# Patient Record
Sex: MALE | Race: WHITE | NOT HISPANIC OR LATINO | Employment: FULL TIME | URBAN - NONMETROPOLITAN AREA
[De-identification: names, ages, dates, MRNs, and addresses within clinical notes are randomized per-mention and may not be internally consistent; named-entity substitution may affect disease eponyms.]

---

## 2023-01-13 ENCOUNTER — OFFICE VISIT (OUTPATIENT)
Dept: UROLOGY | Facility: CLINIC | Age: 43
End: 2023-01-13
Payer: COMMERCIAL

## 2023-01-13 VITALS
OXYGEN SATURATION: 95 % | HEART RATE: 73 BPM | WEIGHT: 243 LBS | BODY MASS INDEX: 29.59 KG/M2 | DIASTOLIC BLOOD PRESSURE: 90 MMHG | TEMPERATURE: 98.2 F | HEIGHT: 76 IN | SYSTOLIC BLOOD PRESSURE: 130 MMHG

## 2023-01-13 DIAGNOSIS — E29.1 HYPOGONADISM MALE: Primary | ICD-10-CM

## 2023-01-13 PROCEDURE — 99204 OFFICE O/P NEW MOD 45 MIN: CPT | Performed by: PHYSICIAN ASSISTANT

## 2023-01-13 RX ORDER — MELATONIN: COMMUNITY

## 2023-01-13 RX ORDER — DEXTROAMPHETAMINE SULFATE, DEXTROAMPHETAMINE SACCHARATE, AMPHETAMINE SULFATE AND AMPHETAMINE ASPARTATE 6.25; 6.25; 6.25; 6.25 MG/1; MG/1; MG/1; MG/1
CAPSULE, EXTENDED RELEASE ORAL
COMMUNITY
Start: 2022-12-15

## 2023-01-13 RX ORDER — IBUPROFEN 800 MG/1
TABLET ORAL
COMMUNITY
Start: 2023-01-08

## 2023-01-13 RX ORDER — ESOMEPRAZOLE MAGNESIUM 40 MG/1
CAPSULE, DELAYED RELEASE ORAL
COMMUNITY
Start: 2022-12-28

## 2023-01-13 RX ORDER — LAMOTRIGINE 100 MG/1
TABLET ORAL
COMMUNITY
Start: 2022-11-21

## 2023-01-13 NOTE — PROGRESS NOTES
"Chief Complaint  Low testosterone    Referring Provider  Amalia Praekh APRN    HPI  Mr. Rosales is a 42 y.o. male with history of low T who presents with low testosterone.  The serum test was collected due to the following complaints: fatigue, low libido.    Previously saw Dr. Martinez, 2 years ago, who treated with weekly TC and arimidex. Developed kidney disease, medicines stopped, and kidney function returned to normal. Last HRT was about 2 years ago.     Notes he sleeps about 5 hours per day, chronically     Low libido   yes  Low energy   yes  Poor sleep   yes  Mental clarity issues  yes    History of depression? no  Erectile dysfunction?  no    Any potential interest in conception?  no    Past Medical History  Past Medical History:   Diagnosis Date   • ADHD    • Mood change        Past Surgical History  Past Surgical History:   Procedure Laterality Date   • KNEE SURGERY  2004       Medications    Current Outpatient Medications:   •  Adderall XR 25 MG 24 hr capsule, , Disp: , Rfl:   •  cholecalciferol (VITAMIN D3) 25 MCG (1000 UT) tablet, , Disp: , Rfl:   •  esomeprazole (nexIUM) 40 MG capsule, , Disp: , Rfl:   •  ibuprofen (ADVIL,MOTRIN) 800 MG tablet, TAKE ONE TABLET BY MOUTH WITH FOOD OR MILK THREE TIMES DAILY AS NEEDED FOR 10 DAYS, Disp: , Rfl:   •  lamoTRIgine (LaMICtal) 100 MG tablet, , Disp: , Rfl:   •  Vortioxetine HBr (TRINTELLIX) 10 MG tablet tablet, , Disp: , Rfl:     Allergies  Allergies   Allergen Reactions   • Cephalosporins Anaphylaxis   • Doxycycline Anaphylaxis   • Reglan [Metoclopramide] Irritability   • Mobic [Meloxicam] Rash   • Sulfa Antibiotics Rash       Social History  Social History     Socioeconomic History   • Marital status:        Family History  Paternal grandfather had prostate cancer      Physical Exam  Visit Vitals  /90   Pulse 73   Temp 98.2 °F (36.8 °C)   Ht 193 cm (76\")   Wt 110 kg (243 lb)   SpO2 95%   BMI 29.58 kg/m²       Assessment  Mr. Rosales is a 42 y.o. male " with low testosterone.  Today we discussed the role testosterone plays for a male patient. I have indicated that low testosterone can be associated with metabolic syndrome, erectile dysfunction, coronary artery disease, diabetes, depression, osteoporosis, fatigue, low sex drive, poor sleep, high cholesterol, increased abdominal fat, muscle loss, irritability, hot flashes, and inability to concentrate.     Treatment options were discussed including SERMs, aromatase inhibitors, topical gel or patch, oral troches, nasal spray, testosterone injections every 2-3 weeks, long-acting injections every 10 weeks, and testosterone pellets placed in clinic every 4-6 months.    I explained the risks, benefits, and alternatives to testosterone therapies. I informed him of the potential SEs of chest and leg swelling, increased acne, change in mood, polycythemia, and worsening of undiagnosed prostate cancer.       Plan  1.  He wishes to proceed with a trial of testosterone cypionate (will start once 2nd set of labs obtained)  2. Obtain testosterone panel, H&H, BMP, and PSA   3. Will obtain records from PCP        Jing Jones PA-C

## 2023-01-18 DIAGNOSIS — E29.1 HYPOGONADISM MALE: Primary | ICD-10-CM

## 2023-01-18 RX ORDER — BLOOD PRESSURE TEST KIT
KIT MISCELLANEOUS
Qty: 100 EACH | Refills: 11 | Status: SHIPPED | OUTPATIENT
Start: 2023-01-18

## 2023-01-18 RX ORDER — TESTOSTERONE CYPIONATE 200 MG/ML
100 INJECTION, SOLUTION INTRAMUSCULAR WEEKLY
Qty: 2 ML | Refills: 0 | Status: SHIPPED | OUTPATIENT
Start: 2023-01-18 | End: 2023-02-17

## 2023-02-17 DIAGNOSIS — E29.1 HYPOGONADISM MALE: ICD-10-CM

## 2023-02-17 RX ORDER — TESTOSTERONE CYPIONATE 200 MG/ML
INJECTION, SOLUTION INTRAMUSCULAR
Qty: 2 ML | Refills: 0 | Status: SHIPPED | OUTPATIENT
Start: 2023-02-17 | End: 2023-03-13

## 2023-03-13 DIAGNOSIS — E29.1 HYPOGONADISM MALE: ICD-10-CM

## 2023-03-13 RX ORDER — TESTOSTERONE CYPIONATE 200 MG/ML
INJECTION, SOLUTION INTRAMUSCULAR
Qty: 2 ML | Refills: 0 | Status: SHIPPED | OUTPATIENT
Start: 2023-03-13

## 2023-04-14 DIAGNOSIS — E29.1 HYPOGONADISM MALE: ICD-10-CM

## 2023-04-14 RX ORDER — TESTOSTERONE CYPIONATE 200 MG/ML
INJECTION, SOLUTION INTRAMUSCULAR
Qty: 2 ML | Refills: 0 | OUTPATIENT
Start: 2023-04-14

## 2023-04-25 DIAGNOSIS — E29.1 HYPOGONADISM MALE: ICD-10-CM

## 2023-04-25 RX ORDER — TESTOSTERONE CYPIONATE 200 MG/ML
INJECTION, SOLUTION INTRAMUSCULAR
Qty: 2 ML | Refills: 0 | Status: SHIPPED | OUTPATIENT
Start: 2023-04-25

## 2023-05-12 ENCOUNTER — TELEPHONE (OUTPATIENT)
Dept: UROLOGY | Facility: CLINIC | Age: 43
End: 2023-05-12
Payer: COMMERCIAL

## 2023-05-12 NOTE — TELEPHONE ENCOUNTER
Called and spoke with patient giving him a reminder to get his labs completed before his appt on 5/19.    Reg, CMA

## 2023-05-19 ENCOUNTER — OFFICE VISIT (OUTPATIENT)
Dept: UROLOGY | Facility: CLINIC | Age: 43
End: 2023-05-19
Payer: COMMERCIAL

## 2023-05-19 VITALS
HEIGHT: 76 IN | TEMPERATURE: 97.1 F | SYSTOLIC BLOOD PRESSURE: 118 MMHG | DIASTOLIC BLOOD PRESSURE: 84 MMHG | WEIGHT: 243 LBS | BODY MASS INDEX: 29.59 KG/M2 | HEART RATE: 73 BPM | OXYGEN SATURATION: 96 %

## 2023-05-19 DIAGNOSIS — E29.1 HYPOGONADISM MALE: Primary | ICD-10-CM

## 2023-05-19 RX ORDER — TESTOSTERONE CYPIONATE 200 MG/ML
200 INJECTION, SOLUTION INTRAMUSCULAR WEEKLY
Qty: 4 ML | Refills: 0 | Status: CANCELLED | OUTPATIENT
Start: 2023-05-19

## 2023-05-19 RX ORDER — NEEDLES, DISPOSABLE 25GX5/8"
1 NEEDLE, DISPOSABLE MISCELLANEOUS WEEKLY
Qty: 50 EACH | Refills: 11 | Status: SHIPPED | OUTPATIENT
Start: 2023-05-19

## 2023-05-19 RX ORDER — TESTOSTERONE CYPIONATE 200 MG/ML
200 INJECTION, SOLUTION INTRAMUSCULAR WEEKLY
Qty: 4 ML | Refills: 0 | Status: SHIPPED | OUTPATIENT
Start: 2023-05-19

## 2023-05-19 RX ORDER — SODIUM CHLORIDE 9 MG/ML
250 INJECTION, SOLUTION INTRAVENOUS ONCE
OUTPATIENT
Start: 2023-05-19

## 2023-05-19 RX ORDER — LISDEXAMFETAMINE DIMESYLATE 50 MG
CAPSULE ORAL
COMMUNITY
Start: 2023-05-16

## 2023-05-19 NOTE — PROGRESS NOTES
"Chief Complaint   Patient presents with   • Hypogonadism     3 month fu w/ lab results        HPI  Mr. Rosales is a 42 y.o. male with history of hypgonadism on HRT who presents for follow up.     At this visit, has been injecting weekly as prescribed.  He continues to feel very fatigued with very little drive.  He actually has not exercised in several weeks.  He does admit to very poor sleep quantity getting maybe 4 to 6 hours of sleep per night.    Past Medical History:   Diagnosis Date   • ADHD    • Mood change        Past Surgical History:   Procedure Laterality Date   • KNEE SURGERY  2004         Current Outpatient Medications:   •  Alcohol Swabs pads, Clean area prior to injection, Disp: 100 each, Rfl: 11  •  cholecalciferol (VITAMIN D3) 25 MCG (1000 UT) tablet, , Disp: , Rfl:   •  esomeprazole (nexIUM) 40 MG capsule, , Disp: , Rfl:   •  ibuprofen (ADVIL,MOTRIN) 800 MG tablet, TAKE ONE TABLET BY MOUTH WITH FOOD OR MILK THREE TIMES DAILY AS NEEDED FOR 10 DAYS, Disp: , Rfl:   •  lamoTRIgine (LaMICtal) 100 MG tablet, , Disp: , Rfl:   •  Needle, Disp, 18G X 1-1/2\" misc, Use for drawing up the medication, Disp: 50 each, Rfl: 3  •  Needle, Disp, 23G X 1-1/2\" misc, 1 Device 1 (One) Time Per Week., Disp: 30 each, Rfl: 11  •  Syringe, Disposable, 3 ML misc, 1 syringe 1 (One) Time Per Week., Disp: 100 each, Rfl: 11  •  Testosterone Cypionate (DEPOTESTOTERONE CYPIONATE) 200 MG/ML injection, INJECT 0.5ML INTO THE APPROPRIATE MUSCLE AS DIRECTED ONCE A WEEK..REQUEST REFILL OF SCRIPT IMMEDIATELY UPON PICKUP THRU PHARMACY OR MYCHART, Disp: 2 mL, Rfl: 0  •  Vortioxetine HBr (TRINTELLIX) 10 MG tablet tablet, , Disp: , Rfl:   •  Adderall XR 25 MG 24 hr capsule, , Disp: , Rfl:   •  Vyvanse 50 MG capsule, , Disp: , Rfl:      Physical Exam  Visit Vitals  /84 (BP Location: Left arm, Patient Position: Sitting, Cuff Size: Adult)   Pulse 73   Temp 97.1 °F (36.2 °C) (Temporal)   Ht 193 cm (76\")   Wt 110 kg (243 lb)   SpO2 96%   BMI " 29.58 kg/m²       Labs  Total testosterone 185  Hematocrit 52      Assessment  42 y.o. male with history of hypogonadism on HRT.    He tells me that he was 1 day overdue for his injection when he had his labs obtained.  That said his total testosterone is quite subtherapeutic I suspect he is not fully treated with 100 mg weekly.  I recommend we increase his dose to 200 mg weekly.  We will need to manage his polycythemia today and watch closely that this does not increase with dose increase.    Plan  1.  Therapeutic phlebotomy versus KBC donation, per patient preference (both are prescribed)  2.  Start testosterone cypionate 200 mg weekly  3.  Follow-up in 3 months by phone with testosterone, estradiol, and H&H prior

## 2023-07-28 DIAGNOSIS — E29.1 HYPOGONADISM MALE: ICD-10-CM

## 2023-07-28 RX ORDER — TESTOSTERONE CYPIONATE 200 MG/ML
200 INJECTION, SOLUTION INTRAMUSCULAR WEEKLY
Qty: 4 ML | Refills: 0 | Status: SHIPPED | OUTPATIENT
Start: 2023-07-28

## 2023-08-18 ENCOUNTER — TELEMEDICINE (OUTPATIENT)
Dept: UROLOGY | Facility: CLINIC | Age: 43
End: 2023-08-18
Payer: COMMERCIAL

## 2023-08-18 DIAGNOSIS — E29.1 HYPOGONADISM MALE: ICD-10-CM

## 2023-08-18 RX ORDER — TESTOSTERONE CYPIONATE 200 MG/ML
200 INJECTION, SOLUTION INTRAMUSCULAR WEEKLY
Qty: 4 ML | Refills: 0 | Status: SHIPPED | OUTPATIENT
Start: 2023-08-18

## 2023-08-18 NOTE — PROGRESS NOTES
"Chief Complaint   Patient presents with    Hypogonadism        HPI  Mr. Rosales is a 43 y.o. male with history of hypogonadism who consented to receive follow-up care by A/V visit.    At this visit, overall is feeling well on HRT.  He does note he continues to have some fatigue.  He is not sure if this is related to testosterone or his sleep schedule with work.    Past Medical History:   Diagnosis Date    ADHD     Mood change        Past Surgical History:   Procedure Laterality Date    KNEE SURGERY  2004         Current Outpatient Medications:     Testosterone Cypionate (DEPOTESTOTERONE CYPIONATE) 200 MG/ML injection, Inject 1 mL into the appropriate muscle as directed by prescriber 1 (One) Time Per Week., Disp: 4 mL, Rfl: 0    Alcohol Swabs pads, Clean area prior to injection, Disp: 100 each, Rfl: 11    cholecalciferol (VITAMIN D3) 25 MCG (1000 UT) tablet, , Disp: , Rfl:     esomeprazole (nexIUM) 40 MG capsule, , Disp: , Rfl:     ibuprofen (ADVIL,MOTRIN) 800 MG tablet, TAKE ONE TABLET BY MOUTH WITH FOOD OR MILK THREE TIMES DAILY AS NEEDED FOR 10 DAYS, Disp: , Rfl:     lamoTRIgine (LaMICtal) 100 MG tablet, , Disp: , Rfl:     Needle, Disp, (BD Disp Needle) 23G X 1\" misc, 1 Device 1 (One) Time Per Week., Disp: 50 each, Rfl: 11    Needle, Disp, 18G X 1-1/2\" misc, Use for drawing up the medication, Disp: 50 each, Rfl: 3    Syringe, Disposable, 3 ML misc, 1 syringe 1 (One) Time Per Week., Disp: 100 each, Rfl: 11    Vortioxetine HBr (TRINTELLIX) 10 MG tablet tablet, , Disp: , Rfl:     Vyvanse 50 MG capsule, , Disp: , Rfl:      Physical Exam  There were no vitals taken for this visit.    Labs  Brief Urine Lab Results       None              Assessment  43 y.o. male with history of hypogonadism.    Preliminary labs are obtained from [facility].  The patient CBC has not yet returned.  He assures me that he donated blood after our last visit and his polycythemia should be resolved.  His testosterone was obtained within 24 hours of " injection and reflects a peak value at 1207.  His estradiol is at the higher limits at 70.8.  I would expect this value to normalize as he continues HRT.  We will discuss any symptoms of estrogen excess at follow-up and recheck this lab.    Plan  1.  Continue weekly testosterone cypionate 200 mg  2.  Follow-up in 6 months with H&H total T, estradiol    This visit was ATTEMPTED to be conducted via secure, live, face-to-face video conferencing through Epic. Telephone assistance had to be used to complete the visit.

## 2023-09-27 DIAGNOSIS — E29.1 HYPOGONADISM MALE: ICD-10-CM

## 2023-09-28 RX ORDER — TESTOSTERONE CYPIONATE 200 MG/ML
200 INJECTION, SOLUTION INTRAMUSCULAR WEEKLY
Qty: 4 ML | Refills: 0 | Status: SHIPPED | OUTPATIENT
Start: 2023-09-28

## 2023-10-28 DIAGNOSIS — E29.1 HYPOGONADISM MALE: ICD-10-CM

## 2023-10-30 RX ORDER — TESTOSTERONE CYPIONATE 200 MG/ML
200 INJECTION, SOLUTION INTRAMUSCULAR WEEKLY
Qty: 4 ML | Refills: 0 | Status: SHIPPED | OUTPATIENT
Start: 2023-10-30

## 2023-11-27 DIAGNOSIS — E29.1 HYPOGONADISM MALE: ICD-10-CM

## 2023-11-27 RX ORDER — TESTOSTERONE CYPIONATE 200 MG/ML
200 INJECTION, SOLUTION INTRAMUSCULAR WEEKLY
Qty: 4 ML | Refills: 0 | Status: SHIPPED | OUTPATIENT
Start: 2023-11-27

## 2023-12-20 DIAGNOSIS — E29.1 HYPOGONADISM MALE: ICD-10-CM

## 2023-12-20 RX ORDER — TESTOSTERONE CYPIONATE 200 MG/ML
200 INJECTION, SOLUTION INTRAMUSCULAR WEEKLY
Qty: 4 ML | Refills: 0 | Status: SHIPPED | OUTPATIENT
Start: 2023-12-20

## 2023-12-26 ENCOUNTER — TELEPHONE (OUTPATIENT)
Dept: UROLOGY | Facility: CLINIC | Age: 43
End: 2023-12-26

## 2023-12-26 NOTE — TELEPHONE ENCOUNTER
Caller: JUNIOR CERVANTES    Relationship: Emergency Contact    Best call back number: [phone]    What orders are you requesting (i.e. lab or imaging): HEMOGLOBIN BLOOD COUNT WAS TOO HIGH. NEEDS AN ORDER TO REMOVE THE BLOOD VIA FAXED TO PCP AND THEIR FAX NUMBER [phone]    In what timeframe would the patient need to come in: AS SOON AS POSSIBLE    Where will you receive your lab/imaging services: [facility] [place].    Additional notes: RECEIVED A CALL FROM SPOUSE CALLING IN REGARDS TO AN ORDER TO VIA FAXED TO [facility] FOR HIS BLOOD TO BE REMOVED BECAUSE HIS HEMOGLOBIN WAS TOO HIGH. SHE STATED OUR OFFICE IS FAMILIAR WITH THIS PROCESS AND IN THE PAST JEFERSON KUMAR HAS SENT THESE ORDERS

## 2023-12-26 NOTE — TELEPHONE ENCOUNTER
Caller: JUNIOR    Relationship: WIFE    Best call back number: [phone]     What orders are you requesting (i.e. lab or imaging): LAB    In what timeframe would the patient need to come in: ASAP    Where will you receive your lab/imaging services:     Additional notes: PT HAS TOO MUCH BLOOD. NEED ORDER FOR REMOVAL

## 2024-01-02 DIAGNOSIS — E29.1 HYPOGONADISM MALE: Primary | ICD-10-CM

## 2024-01-02 RX ORDER — SODIUM CHLORIDE 9 MG/ML
250 INJECTION, SOLUTION INTRAVENOUS ONCE
OUTPATIENT
Start: 2024-01-12

## 2024-01-03 ENCOUNTER — TELEPHONE (OUTPATIENT)
Dept: UROLOGY | Facility: CLINIC | Age: 44
End: 2024-01-03
Payer: COMMERCIAL

## 2024-01-19 DIAGNOSIS — E29.1 HYPOGONADISM MALE: ICD-10-CM

## 2024-01-19 RX ORDER — TESTOSTERONE CYPIONATE 200 MG/ML
200 INJECTION, SOLUTION INTRAMUSCULAR WEEKLY
Qty: 4 ML | Refills: 0 | Status: SHIPPED | OUTPATIENT
Start: 2024-01-19

## 2024-01-22 RX ORDER — SYRINGE, DISPOSABLE, 1 ML
SYRINGE, EMPTY DISPOSABLE MISCELLANEOUS
Qty: 4 EACH | Refills: 3 | Status: SHIPPED | OUTPATIENT
Start: 2024-01-22

## 2024-01-22 RX ORDER — NEEDLES, DISPOSABLE 25GX5/8"
NEEDLE, DISPOSABLE MISCELLANEOUS
Qty: 4 EACH | Refills: 3 | Status: SHIPPED | OUTPATIENT
Start: 2024-01-22

## 2024-01-23 ENCOUNTER — TELEPHONE (OUTPATIENT)
Dept: UROLOGY | Facility: CLINIC | Age: 44
End: 2024-01-23

## 2024-02-23 ENCOUNTER — OFFICE VISIT (OUTPATIENT)
Dept: UROLOGY | Facility: CLINIC | Age: 44
End: 2024-02-23
Payer: COMMERCIAL

## 2024-02-23 DIAGNOSIS — E29.1 HYPOGONADISM MALE: Primary | ICD-10-CM

## 2024-02-23 RX ORDER — TESTOSTERONE CYPIONATE 200 MG/ML
200 INJECTION, SOLUTION INTRAMUSCULAR WEEKLY
Qty: 4 ML | Refills: 0 | Status: SHIPPED | OUTPATIENT
Start: 2024-02-23 | End: 2024-03-24

## 2024-02-23 RX ORDER — DEXTROAMPHETAMINE SACCHARATE, AMPHETAMINE ASPARTATE, DEXTROAMPHETAMINE SULFATE AND AMPHETAMINE SULFATE 2.5; 2.5; 2.5; 2.5 MG/1; MG/1; MG/1; MG/1
15 TABLET ORAL DAILY
COMMUNITY

## 2024-02-23 RX ORDER — NEEDLES, DISPOSABLE 25GX5/8"
NEEDLE, DISPOSABLE MISCELLANEOUS
Qty: 4 EACH | Refills: 3 | Status: SHIPPED | OUTPATIENT
Start: 2024-02-23

## 2024-02-23 NOTE — PROGRESS NOTES
Office Visit Low Testosterone      Patient Name: Иван Rosales  : 1980   MRN: [id number]     Chief Complaint:   Chief Complaint   Patient presents with    Hypogonadism in male     8 month fu w/ lab results       Referring Provider: No ref. provider found    History of Present Illness: Иван Rosales is a 43 y.o. male with a history of hypogonadism on HRT who presents for follow up.      He is injecting weekly. Total testosterone in December was 1115; free testosterone 24.  He states that he had his labs drawn within 24 hours of injection.  He continues to feel fatigued and admits to poor sleep.  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  He also reports that Vyvanse is not doing a good job  controlling his ADD symptoms which may contribute to symptoms.  He has had therapeutic phlebotomy done twice in the past month.  Most recent HCT was 48.8 on 2024.     Objective     Past Medical History:   Past Medical History:   Diagnosis Date    ADHD     Mood change      Past Surgical History:   Past Surgical History:   Procedure Laterality Date    KNEE SURGERY       Family History:   Family History   Problem Relation Age of Onset    Cancer Mother         Throat cancer    Hypertension Mother     Cancer Father         Throat cancer     Social History:   Social History     Socioeconomic History    Marital status:    Tobacco Use    Smoking status: Never    Smokeless tobacco: Never   Vaping Use    Vaping Use: Never used   Substance and Sexual Activity    Alcohol use: Never    Drug use: Never    Sexual activity: Yes     Partners: Female     Birth control/protection: None     Medications:     Current Outpatient Medications:     Alcohol Swabs pads, Clean area prior to injection, Disp: 100 each, Rfl: 11    amphetamine-dextroamphetamine (ADDERALL) 10 MG tablet, Take 1.5 tablets by mouth Daily., Disp: , Rfl:     B-D SYRINGE LUER-AGAPITO 1CC 1 ML misc, USE WITH  "TESTOSTERONE ONCE A WEEK AS DIRECTED, Disp: 4 each, Rfl: 3    cholecalciferol (VITAMIN D3) 25 MCG (1000 UT) tablet, Take 2 tablets by mouth Daily., Disp: , Rfl:     Needle, Disp, (BD Disp Needle) 23G X 1\" misc, Use as directed to inject testosterone once a week, Disp: 4 each, Rfl: 3    Needle, Disp, (BD Disp Needles) 20G X 1-1/2\" misc, Use as directed to draw up testosterone once a week, Disp: 4 each, Rfl: 3    Needle, Disp, 18G X 1-1/2\" misc, Use for drawing up the medication, Disp: 50 each, Rfl: 3    Syringe, Disposable, 3 ML misc, 1 syringe 1 (One) Time Per Week., Disp: 100 each, Rfl: 11    Testosterone Cypionate (DEPOTESTOTERONE CYPIONATE) 200 MG/ML injection, Inject 1 mL into the appropriate muscle as directed by prescriber 1 (One) Time Per Week for 30 days., Disp: 4 mL, Rfl: 0    Vyvanse 50 MG capsule, , Disp: , Rfl:     Allergies:   Allergies   Allergen Reactions    Cephalosporins Anaphylaxis    Doxycycline Anaphylaxis    Reglan [Metoclopramide] Irritability    Mobic [Meloxicam] Rash    Sulfa Antibiotics Rash       Objective     Physical Exam:   Vital Signs: There were no vitals filed for this visit.  There is no height or weight on file to calculate BMI.   Physical Exam  Vitals and nursing note reviewed.   Constitutional:       Appearance: Normal appearance. He is normal weight. He is not ill-appearing.   HENT:      Head: Normocephalic.   Pulmonary:      Effort: Pulmonary effort is normal.   Psychiatric:         Mood and Affect: Mood normal.         Behavior: Behavior normal.      Labs  12/26/2023:    Testosterone: 1115   Free Testosterone:  24.0  2/16/2024   Hemoglobin: 17.9   Hematocrit 48.8     Assessment / Plan    Assessment  Mr. Rosales is a 43 y.o. male history of hypogonadism on HRT.  Total testosterone 1115; free testosterone 24.0 in December.  Discussed proper timing of testosterone labs and have asked that Mr. Rosales get his labs drawn in the morning midweek after he has taken his injection.  H/H are WNL. "  Has active orders for therapeutic phlebotomy and I will fill out a form for him to be able to donate blood with [facility].  Will recheck his H/H, free and total testosterone and estradiol level in June.      Plan  1.  Weekly testosterone cypionate 200 mg continued and refilled.  Patient aware to contact office for refills monthly.    2.  Repeat total and free testosterone, estradiol, and H/H level in June.  Will follow up with Mr. Rosales in August for his 6 month follow up.   3. Discussed that his fatigue is likely due to a shift work sleep disorder.  Encouraged him to follow up with his PCP to identify other possible causes of fatigue and poor sleep.      Follow Up:   Return in about 6 months (around 8/23/2024) for with labs done in June. .      JEFERSON Ortiz, MSN, FNP-C  [facility]

## 2024-04-04 ENCOUNTER — TELEPHONE (OUTPATIENT)
Dept: UROLOGY | Facility: CLINIC | Age: 44
End: 2024-04-04

## 2024-04-04 DIAGNOSIS — E29.1 HYPOGONADISM MALE: Primary | ICD-10-CM

## 2024-04-04 NOTE — TELEPHONE ENCOUNTER
Provider: FREDERIC LAND    Caller: JUNIOR CERVANTES    Relationship to Patient: SPOUSE    Pharmacy: [facility]- [address]    Phone Number: [phone]    Reason for Call: PT IS OUT OF HIS TESTOSTERONE AND NEEDS REFILL.    PLEASE SEND TO [facility], AND CALL PT WIFE BACK TO CONFIRM SENT    PT IS OUT OF MEDICATION

## 2024-04-05 RX ORDER — TESTOSTERONE CYPIONATE 200 MG/ML
INJECTION, SOLUTION INTRAMUSCULAR
Qty: 4 ML | Refills: 0 | Status: SHIPPED | OUTPATIENT
Start: 2024-04-05

## 2024-05-06 DIAGNOSIS — E29.1 HYPOGONADISM MALE: ICD-10-CM

## 2024-05-06 RX ORDER — TESTOSTERONE CYPIONATE 200 MG/ML
INJECTION, SOLUTION INTRAMUSCULAR
Qty: 4 ML | Refills: 0 | OUTPATIENT
Start: 2024-05-06

## 2024-05-07 DIAGNOSIS — E29.1 HYPOGONADISM MALE: ICD-10-CM

## 2024-05-07 RX ORDER — TESTOSTERONE CYPIONATE 200 MG/ML
200 INJECTION, SOLUTION INTRAMUSCULAR
Qty: 4 ML | Refills: 0 | Status: SHIPPED | OUTPATIENT
Start: 2024-05-07

## 2024-06-03 DIAGNOSIS — E29.1 HYPOGONADISM MALE: ICD-10-CM

## 2024-06-03 RX ORDER — SYRINGE, DISPOSABLE, 1 ML
SYRINGE, EMPTY DISPOSABLE MISCELLANEOUS
Qty: 4 EACH | Refills: 3 | Status: SHIPPED | OUTPATIENT
Start: 2024-06-03

## 2024-06-03 RX ORDER — NEEDLES, DISPOSABLE 25GX5/8"
NEEDLE, DISPOSABLE MISCELLANEOUS
Qty: 4 EACH | Refills: 3 | Status: SHIPPED | OUTPATIENT
Start: 2024-06-03

## 2024-06-04 ENCOUNTER — TELEPHONE (OUTPATIENT)
Dept: UROLOGY | Facility: CLINIC | Age: 44
End: 2024-06-04
Payer: COMMERCIAL

## 2024-06-04 NOTE — TELEPHONE ENCOUNTER
Caller: JUNIOR CERVANTES     Relationship: SPOUSE    Best call back number: [phone]     Requested Prescriptions: Testosterone Cypionate (DEPOTESTOTERONE CYPIONATE) 200 MG/ML injection   Requested Prescriptions      No prescriptions requested or ordered in this encounter        Pharmacy where request should be sent:    [facility] - [phone]  - [phone] FX       Last office visit with prescribing clinician: Visit date not found   Last telemedicine visit with prescribing clinician: Visit date not found   Next office visit with prescribing clinician: Visit date not found     Additional details provided by patient: THERE WAS A REFILL SENT IN FOR THE SYRINGES YESTERDAY BUT NOT FOR THE TESTOSTERONE. THE PT HAS BEEN OUT FOR A FEW DAYS AND IS OVERDUE FOR THE INJECTION.    ANY QUESTIONS, PLEASE GIVE PT'S WIFE A CALL BACK.    Does the patient have less than a 3 day supply:  [x] Yes  [] No    Would you like a call back once the refill request has been completed: [] Yes [x] No    If the office needs to give you a call back, can they leave a voicemail: [] Yes [x] No    Mark English Rep   06/04/24 14:29 EDT

## 2024-06-04 NOTE — TELEPHONE ENCOUNTER
Hub staff attempted to follow warm transfer process and was unsuccessful     Caller: JUNIOR CERVANTES     Relationship to patient: WIFE    Best call back number: [phone]    Patient is needing: PT WIFE CALLING TO STATE PT IS ALSO IN NEED OF A REFILL OF HIS TESTOSTERONE AS WELL PLEASE CALL WIFE BACK TO DISCUSS. THANK YOU        PLEASE SENT REFILL TO     [facility] - [place], [place] - [address] [facility] - [phone]  - [phone] FX

## 2024-06-04 NOTE — TELEPHONE ENCOUNTER
----- Message from Amelia Diaz sent at 6/4/2024  9:21 AM EDT -----  Antonietta,   His HCT is elevated at 51.5 will you call him and make sure he is going to get therapeutic phlebotomy done.  He has an active therapy plan.  If he's already had it done recently will you ask him when. These labs were drawn on 5/27th.  Thanks!   Amelia  ----- Message -----  From: Elisabet Sinclair Incoming  Sent: 6/3/2024   2:33 PM EDT  To: JEFERSON Vale

## 2024-06-05 NOTE — TELEPHONE ENCOUNTER
I called and left  for patient to check his status of therapeutic phlebotomy therapy plan based on latest lab results.  Please transfer to clinical staff, if patient calls back.

## 2024-06-06 ENCOUNTER — TELEPHONE (OUTPATIENT)
Dept: UROLOGY | Facility: CLINIC | Age: 44
End: 2024-06-06
Payer: COMMERCIAL

## 2024-06-06 NOTE — TELEPHONE ENCOUNTER
Hub staff attempted to follow warm transfer process and was unsuccessful     Caller: TERESA    Relationship to patient: PCP    Best call back number: [phone]    Patient is needing: RECEIVED A CALL FROM PCP. SPOKE WITH TERESA AT PT'S PCP. CALLING TO CONFIRM CORRECT FAX NUMBER [phone]. THIS IS THE DIRECT FAX NUMBER TO TERESA PRACTICE MANAGER AT PCP. PT IS CURRENTLY AT THE OFFICE WAITING FOR LABS TO BE FAXED. INFORMED TERESA TO ALLOW 48 HOURS FOR THIS REQUEST TO PROCESS. OFFICE LAB ORDERS FOR ESTRADIOL, HEMOGLOBIN AND HEMATOCRIT TESTOSTERONE, FREE, TOTAL TO BE FAXED TO THE FAX NUMBER PROVIDED PREVIOUS COMMUNICATIONS. OFFICE ONCE LAB ORDERS HAVE BEEN FAXED AND COMPLETED PLEASE CALL PT BACK WITH THESE REGARDS. THANK YOU.

## 2024-06-06 NOTE — TELEPHONE ENCOUNTER
Hub staff attempted to follow warm transfer process and was unsuccessful     Caller: Иван Rosales    Relationship to patient: Self    Best call back number: [phone]    Patient is needing: PATIENT CALLED BACK AND PROVIDED FAX # [phone] FOR HIS LAB ORDER TO BE FAXED TO ASAP. HE IS CURRENTLY AT THE LAB WAITING.

## 2024-06-06 NOTE — TELEPHONE ENCOUNTER
I spoke to patient and let him know the therapeutic phlebotomy order has been faxed to pcp office and refills will not be prescribed until HCT has been lowered. Patient understood.

## 2024-06-06 NOTE — TELEPHONE ENCOUNTER
Hub staff attempted to follow warm transfer process and was unsuccessful     Caller: Иван Rosales    Relationship to patient: Self    Best call back number: [phone]    Patient is needing: PT RETURNED CALL. PLEASE CALL HIM AT YOUR EARLIEST CONVENIENCE. HE IS OUT OF HIS MEDICATION. HE HAS BEEN TRYING TO GET A REFILL ALL WEEK.THANK YOU

## 2024-06-06 NOTE — TELEPHONE ENCOUNTER
Caller: RICK CERVANTES    Relationship: SELF    Best call back number: [phone]    What orders are you requesting (i.e. lab or imaging): therapeutic phlebotomy therapy     In what timeframe would the patient need to come in: PT IS AT THE LAB NOW     Where will you receive your lab/imaging services:     Additional notes: PT CALLED STATING HE IS AT THE LAB AND THEY DO NOT HAVE AN ORDER FOR HIS therapeutic phlebotomy therapy. PLEASE CALL PT ASAP ONCE THE ORDER HAS BEEN FAXED

## 2024-06-07 DIAGNOSIS — E29.1 HYPOGONADISM MALE: ICD-10-CM

## 2024-06-07 NOTE — TELEPHONE ENCOUNTER
Caller: Иван Rosales SATISH    Relationship: Self    Best call back number: [phone]    Requested Prescriptions:   Requested Prescriptions     Pending Prescriptions Disp Refills    Syringe, Disposable, 3 ML misc 100 each 11     Sig: Use 1 syringe 1 (One) Time Per Week.    Testosterone Cypionate (DEPOTESTOTERONE CYPIONATE) 200 MG/ML injection 4 mL 0     Sig: Inject 1 mL into the appropriate muscle as directed by prescriber Every 7 (Seven) Days.        Pharmacy where request should be sent:    [facility] IS THE CORRECT PHARMACY IN CHART.  Last office visit with prescribing clinician: Visit date not found   Last telemedicine visit with prescribing clinician: Visit date not found   Next office visit with prescribing clinician: Visit date not found     Additional details provided by patient:   PT STATED HE HAS COMPLETE LAB ORDERS ON 6/7/24 AND HAS BEEN COMPLETELY OUT OF THE MEDICATION FOR A WEEK. OFFICE PLEASE ADVISE. THANK YOU.   Does the patient have less than a 3 day supply:  [x] Yes  [] No    Would you like a call back once the refill request has been completed: [x] Yes [] No    If the office needs to give you a call back, can they leave a voicemail: [x] Yes [] No    Mark Castano   06/07/24 10:05 EDT

## 2024-06-10 ENCOUNTER — TELEPHONE (OUTPATIENT)
Dept: UROLOGY | Facility: CLINIC | Age: 44
End: 2024-06-10
Payer: COMMERCIAL

## 2024-06-10 NOTE — TELEPHONE ENCOUNTER
"  Caller: Иван Rosales     Relationship: SELF    Best call back number: [phone]     What is the best time to reach you: ANYTIME AFTER 10AM (PT WORKS 2ND SHIFT)    Who are you requesting to speak with (clinical staff, provider,  specific staff member): CLINICAL    Do you know the name of the person who called: PT CALLED OFFICE     What was the call regarding: PT HAD LABS DONE, THERE ARE LABS IN HIS CHART. PLEASE REVIEW AND ADVISE. PT HAS BEEN OUT OF MEDICATION FOR ABOUT A WEEK.     Testosterone Cypionate (DEPOTESTOTERONE CYPIONATE) 200 MG/ML injection     BD Disp Needles 20G X 1-1/2\" misc     B-D SYRINGE LUER-AGAPITO 1CC 1 ML [facility] [address] [phone]  - [phone] FX     Is it okay if the provider responds through MyChart: NO       "

## 2024-06-11 RX ORDER — TESTOSTERONE CYPIONATE 200 MG/ML
200 INJECTION, SOLUTION INTRAMUSCULAR
Qty: 4 ML | Refills: 0 | Status: SHIPPED | OUTPATIENT
Start: 2024-06-11

## 2024-06-11 NOTE — TELEPHONE ENCOUNTER
Patient was notified that testosterone cypionate was refilled and to stay compliant with testo policy

## 2024-07-22 ENCOUNTER — TELEPHONE (OUTPATIENT)
Dept: UROLOGY | Facility: CLINIC | Age: 44
End: 2024-07-22

## 2024-07-22 DIAGNOSIS — E29.1 HYPOGONADISM MALE: ICD-10-CM

## 2024-07-22 RX ORDER — TESTOSTERONE CYPIONATE 200 MG/ML
200 INJECTION, SOLUTION INTRAMUSCULAR
Qty: 4 ML | Refills: 0 | Status: SHIPPED | OUTPATIENT
Start: 2024-07-22

## 2024-07-22 NOTE — TELEPHONE ENCOUNTER
Caller: RICK CERVANTES    Relationship: SELF    Best call back number: [phone]    Requested Prescriptions: TESTOTERONE AND SYRINGES  Requested Prescriptions      No prescriptions requested or ordered in this encounter        Pharmacy where request should be sent:  [facility]      Additional details provided by patient: PT HAS BEEN OUT FOR 2 WEEKS    Does the patient have less than a 3 day supply:  [x] Yes  [] No    Would you like a call back once the refill request has been completed: [x] Yes [] No    If the office needs to give you a call back, can they leave a voicemail: [x] Yes [] No    Mark Bains Rep   07/22/24 14:27 EDT

## 2024-08-23 ENCOUNTER — LAB (OUTPATIENT)
Dept: LAB | Facility: HOSPITAL | Age: 44
End: 2024-08-23
Payer: COMMERCIAL

## 2024-08-23 ENCOUNTER — OFFICE VISIT (OUTPATIENT)
Dept: UROLOGY | Facility: CLINIC | Age: 44
End: 2024-08-23
Payer: COMMERCIAL

## 2024-08-23 VITALS
WEIGHT: 246.4 LBS | RESPIRATION RATE: 12 BRPM | HEART RATE: 76 BPM | OXYGEN SATURATION: 98 % | HEIGHT: 76 IN | TEMPERATURE: 96.8 F | BODY MASS INDEX: 30.01 KG/M2

## 2024-08-23 DIAGNOSIS — D75.1 POLYCYTHEMIA: Primary | ICD-10-CM

## 2024-08-23 DIAGNOSIS — E29.1 HYPOGONADISM MALE: ICD-10-CM

## 2024-08-23 PROBLEM — F41.9 ANXIETY: Status: ACTIVE | Noted: 2024-08-23

## 2024-08-23 PROBLEM — F90.9 ADHD: Status: ACTIVE | Noted: 2024-08-23

## 2024-08-23 PROCEDURE — 84402 ASSAY OF FREE TESTOSTERONE: CPT | Performed by: NURSE PRACTITIONER

## 2024-08-23 PROCEDURE — 85014 HEMATOCRIT: CPT | Performed by: PHYSICIAN ASSISTANT

## 2024-08-23 PROCEDURE — 85018 HEMOGLOBIN: CPT | Performed by: PHYSICIAN ASSISTANT

## 2024-08-23 PROCEDURE — 84403 ASSAY OF TOTAL TESTOSTERONE: CPT | Performed by: NURSE PRACTITIONER

## 2024-08-23 PROCEDURE — 82670 ASSAY OF TOTAL ESTRADIOL: CPT | Performed by: PHYSICIAN ASSISTANT

## 2024-08-23 RX ORDER — TESTOSTERONE CYPIONATE 200 MG/ML
200 INJECTION, SOLUTION INTRAMUSCULAR
Qty: 4 ML | Refills: 0 | Status: SHIPPED | OUTPATIENT
Start: 2024-08-23

## 2024-08-23 NOTE — PROGRESS NOTES
"       Office Visit Follow Up      Patient Name: Иван Rosales  : 1980   MRN: [id number]     Chief Complaint:   Chief Complaint   Patient presents with    Hypogonadism    Follow-up       Referring Provider: No ref. provider found    History of Present Illness: Иван Rosales is a 44 y.o. male who presents today for follow up with hypogonadism and polycythemia.  Most recent labs to review are from  and his HCT was 51.5, total testosterone was 915, and free testosterone was 21.4. Estradiol 38.8.  He had therapeutic phlebotomy done on  and most recently performed yesterday with HCT of 55.  Today he is feeling well and has no complaints.      Subjective      Review of System:   As noted in HPI    Medications:     Current Outpatient Medications:     Alcohol Swabs pads, Clean area prior to injection, Disp: 100 each, Rfl: 11    amphetamine-dextroamphetamine (ADDERALL) 10 MG tablet, Take 1.5 tablets by mouth Daily., Disp: , Rfl:     B-D SYRINGE LUER-AGAPITO 1CC 1 ML misc, USE WITH TESTOSTERONE ONCE A WEEK AS DIRECTED, Disp: 4 each, Rfl: 3    BD Disp Needles 20G X 1-1/2\" misc, USE AS DIRECTED TO DRAW UP TESTOSTERONE ONCE A WEEK, Disp: 4 each, Rfl: 3    cholecalciferol (VITAMIN D3) 25 MCG (1000 UT) tablet, Take 2 tablets by mouth Daily., Disp: , Rfl:     Needle, Disp, (BD Disp Needle) 23G X 1\" misc, Use as directed to inject testosterone once a week, Disp: 4 each, Rfl: 3    Needle, Disp, 18G X 1-1/2\" misc, Use for drawing up the medication, Disp: 50 each, Rfl: 3    Syringe, Disposable, 3 ML misc, Use 1 syringe 1 (One) Time Per Week., Disp: 100 each, Rfl: 11    Testosterone Cypionate (DEPOTESTOTERONE CYPIONATE) 200 MG/ML injection, Inject 1 mL into the appropriate muscle as directed by prescriber Every 7 (Seven) Days., Disp: 4 mL, Rfl: 0    Vyvanse 50 MG capsule, , Disp: , Rfl:     Allergies:   Allergies   Allergen Reactions    Cephalosporins Anaphylaxis    Doxycycline Anaphylaxis    Reglan [Metoclopramide] " "Irritability    Mobic [Meloxicam] Rash    Sulfa Antibiotics Rash       Objective     Physical Exam:   Vital Signs:   Vitals:    08/23/24 1358   Pulse: 76   Resp: 12   Temp: 96.8 °F (36 °C)   TempSrc: Temporal   SpO2: 98%   Weight: 112 kg (246 lb 6.4 oz)   Height: 193 cm (76\")     Body mass index is 29.99 kg/m².     Physical Exam  Vitals and nursing note reviewed.   Constitutional:       General: He is not in acute distress.     Appearance: Normal appearance. He is not ill-appearing.   HENT:      Head: Normocephalic and atraumatic.   Pulmonary:      Effort: Pulmonary effort is normal.   Skin:     General: Skin is warm and dry.   Neurological:      General: No focal deficit present.      Mental Status: He is alert and oriented to person, place, and time.   Psychiatric:         Mood and Affect: Mood normal.         Behavior: Behavior normal.      Labs:   Brief Urine Lab Results       None        Images:   No Images in the past 120 days found.      Assessment / Plan      Assessment/Plan: Иван Rosales is a 44 y.o. male who presents today for follow up with hypogonadism and polycythemia.  Most recent labs to review are from June and his HCT was 51.5, total testosterone was 915, and free testosterone was 21.4. Estradiol 38.8.  He had therapeutic phlebotomy done on June 7th and most recently performed yesterday with HCT of 55.  Today he is feeling well and has no complaints.      Due to recurrent polycythemia I have asked Иван to try to split dose his Testosterone cypionate. He will now take 100 mg of Testosterone Cypionate 100 mg IM on Sundays and then again on Thursday.  I will have him follow up with me in 3 months with labs done on Thursday morning the week prior, before 10 am.  He was agreeable to this plan. Will recheck his H/H today to make sure that therapeutic phlebotomy was effective.   He was agreeable to this plan.      Diagnoses and all orders for this visit:    1. Polycythemia (Primary)  -     Cancel: " Hemoglobin & Hematocrit, Blood    2. Hypogonadism male  -     Testosterone Cypionate (DEPOTESTOTERONE CYPIONATE) 200 MG/ML injection; Inject 1 mL into the appropriate muscle as directed by prescriber Every 7 (Seven) Days.  Dispense: 4 mL; Refill: 0  -     Cancel: Hemoglobin & Hematocrit, Blood    Follow Up:   Return in about 3 months (around 11/23/2024) for Next scheduled follow up, with Amelia with Labs done prior.    JEFERSON King, NP-C  [facility] [place]

## 2024-09-22 DIAGNOSIS — E29.1 HYPOGONADISM MALE: ICD-10-CM

## 2024-09-23 RX ORDER — NEEDLES, DISPOSABLE 25GX5/8"
NEEDLE, DISPOSABLE MISCELLANEOUS
Qty: 4 EACH | Refills: 3 | Status: SHIPPED | OUTPATIENT
Start: 2024-09-23

## 2024-09-23 RX ORDER — BLOOD PRESSURE TEST KIT
KIT MISCELLANEOUS
Qty: 100 EACH | Refills: 11 | Status: SHIPPED | OUTPATIENT
Start: 2024-09-23

## 2024-09-23 RX ORDER — TESTOSTERONE CYPIONATE 200 MG/ML
200 INJECTION, SOLUTION INTRAMUSCULAR
Qty: 4 ML | Refills: 0 | Status: SHIPPED | OUTPATIENT
Start: 2024-09-23

## 2024-09-23 RX ORDER — SYRINGE, DISPOSABLE, 1 ML
1 SYRINGE, EMPTY DISPOSABLE MISCELLANEOUS WEEKLY
Qty: 4 EACH | Refills: 11 | Status: SHIPPED | OUTPATIENT
Start: 2024-09-23

## 2024-11-07 DIAGNOSIS — E29.1 HYPOGONADISM MALE: ICD-10-CM

## 2024-11-08 RX ORDER — TESTOSTERONE CYPIONATE 200 MG/ML
200 INJECTION, SOLUTION INTRAMUSCULAR
Qty: 4 ML | Refills: 0 | Status: SHIPPED | OUTPATIENT
Start: 2024-11-08

## 2024-11-08 RX ORDER — NEEDLES, DISPOSABLE 25GX5/8"
NEEDLE, DISPOSABLE MISCELLANEOUS
Qty: 4 EACH | Refills: 3 | OUTPATIENT
Start: 2024-11-08

## 2024-11-08 RX ORDER — SYRINGE, DISPOSABLE, 1 ML
1 SYRINGE, EMPTY DISPOSABLE MISCELLANEOUS WEEKLY
Qty: 4 EACH | Refills: 11 | OUTPATIENT
Start: 2024-11-08

## 2024-11-08 RX ORDER — BLOOD PRESSURE TEST KIT
KIT MISCELLANEOUS
Qty: 100 EACH | Refills: 11 | OUTPATIENT
Start: 2024-11-08

## 2024-11-08 NOTE — TELEPHONE ENCOUNTER
Patient is compliant with TRT testosterone replacement policy    Last OV visit      08/23/2024    Last LABS        08/23/2024    Next scheduled OV visit 11/22/2024    Refill due (overdue 10/21/2024)     Confirmed pharmacy  [facility]

## 2024-11-15 ENCOUNTER — TELEPHONE (OUTPATIENT)
Dept: UROLOGY | Facility: CLINIC | Age: 44
End: 2024-11-15

## 2024-11-15 ENCOUNTER — TELEPHONE (OUTPATIENT)
Dept: UROLOGY | Facility: CLINIC | Age: 44
End: 2024-11-15
Payer: COMMERCIAL

## 2024-11-15 DIAGNOSIS — E29.1 HYPOGONADISM MALE: Primary | ICD-10-CM

## 2024-11-15 NOTE — TELEPHONE ENCOUNTER
Caller: JUNIOR    Relationship: WIFE    Best call back number: [phone]    What orders are you requesting (i.e. lab or imaging): LABS    In what timeframe would the patient need to come in: 11/18/24    Where will you receive your lab[facility]imaging services[facility]      Additional notes: PLEASE FAX ORDER [phone]    AND CALL PT TO CONFIRM IT HAS BEEN SENT

## 2024-11-15 NOTE — TELEPHONE ENCOUNTER
Caller: JUNIOR    Relationship: WIFE    Best call back number: [phone]    Requested Prescriptions: TESTOTERONE  Requested Prescriptions      No prescriptions requested or ordered in this encounter        Pharmacy where request should be sent:  [facility]    Last office visit with prescribing clinician: 8/23/2024   Last telemedicine visit with prescribing clinician: Visit date not found   Next office visit with prescribing clinician: 11/22/2024     Additional details provided by patient: PT IS OUT    Does the patient have less than a 3 day supply:  [x] Yes  [] No    Would you like a call back once the refill request has been completed: [x] Yes [] No    If the office needs to give you a call back, can they leave a voicemail: [x] Yes [] No    Mark Bains Rep   11/15/24 15:49 EST

## 2024-11-17 DIAGNOSIS — E29.1 HYPOGONADISM MALE: ICD-10-CM

## 2024-11-17 RX ORDER — TESTOSTERONE CYPIONATE 200 MG/ML
200 INJECTION, SOLUTION INTRAMUSCULAR
Qty: 4 ML | Refills: 0 | Status: SHIPPED | OUTPATIENT
Start: 2024-11-17

## 2024-11-22 ENCOUNTER — TELEPHONE (OUTPATIENT)
Dept: UROLOGY | Facility: CLINIC | Age: 44
End: 2024-11-22

## 2024-11-22 NOTE — TELEPHONE ENCOUNTER
I called and left  for patient to let him know we reached out to PCP and the lab results were not back yet since labs were drawn two days ago per Amalia Parekh office.

## 2024-11-22 NOTE — TELEPHONE ENCOUNTER
Patient was here in office for follow up visit, labs had not been resulted in Epic.  Patient was told it was his responsible to get lab results even if it's done external.  Patient got angry and stormed and stated he was switching pcp.

## 2024-12-02 DIAGNOSIS — D75.1 POLYCYTHEMIA: ICD-10-CM

## 2024-12-02 DIAGNOSIS — E29.1 HYPOGONADISM MALE: Primary | ICD-10-CM

## 2024-12-11 DIAGNOSIS — E29.1 HYPOGONADISM MALE: ICD-10-CM

## 2024-12-11 RX ORDER — TESTOSTERONE CYPIONATE 200 MG/ML
200 INJECTION, SOLUTION INTRAMUSCULAR
Qty: 4 ML | Refills: 0 | Status: SHIPPED | OUTPATIENT
Start: 2024-12-11

## 2024-12-30 ENCOUNTER — TELEMEDICINE (OUTPATIENT)
Dept: UROLOGY | Facility: CLINIC | Age: 44
End: 2024-12-30
Payer: COMMERCIAL

## 2024-12-30 DIAGNOSIS — E29.1 HYPOGONADISM MALE: ICD-10-CM

## 2024-12-30 DIAGNOSIS — D75.1 POLYCYTHEMIA: Primary | ICD-10-CM

## 2024-12-30 PROCEDURE — 99213 OFFICE O/P EST LOW 20 MIN: CPT | Performed by: NURSE PRACTITIONER

## 2024-12-30 RX ORDER — TESTOSTERONE CYPIONATE 200 MG/ML
200 INJECTION, SOLUTION INTRAMUSCULAR
Qty: 4 ML | Refills: 0 | Status: SHIPPED | OUTPATIENT
Start: 2024-12-30

## 2024-12-30 RX ORDER — DEXTROAMPHETAMINE SACCHARATE, AMPHETAMINE ASPARTATE, DEXTROAMPHETAMINE SULFATE AND AMPHETAMINE SULFATE 3.75; 3.75; 3.75; 3.75 MG/1; MG/1; MG/1; MG/1
15 TABLET ORAL
COMMUNITY
Start: 2024-12-24

## 2024-12-30 NOTE — PROGRESS NOTES
"       Office Visit Follow Up      Patient Name: вИан Rosales  : 1980   MRN: [id number]     Chief Complaint:   Chief Complaint   Patient presents with    Hypogonadism     Referring Provider: No ref. provider found    History of Present Illness:  Consented to video visit:  Yes  Patient located:  His home in [place]  Provider location:  [facility] [place] [facility]     Иван Rosales is a 44 y.o. male who presents today for follow up with history of hypogonadism on TRT.  Total testosterone is 444.3 and free testosterone was 10.7. Hemoglobin is 17.6 and hematocrit was 51.8.  He last had therapeutic phlebotomy done on . He is overall feeling well. No new or worsening urologic complaints.  Requested order to donate blood at [facility] be faxed today.      Subjective      Review of System:   As noted in HPI    Medications:     Current Outpatient Medications:     amphetamine-dextroamphetamine (ADDERALL) 15 MG tablet, 1 tablet., Disp: , Rfl:     Testosterone Cypionate (DEPOTESTOTERONE CYPIONATE) 200 MG/ML injection, Inject 1 mL into the appropriate muscle as directed by prescriber Every 7 (Seven) Days., Disp: 4 mL, Rfl: 0    Alcohol Swabs pads, Clean area prior to injection, Disp: 100 each, Rfl: 11    cholecalciferol (VITAMIN D3) 25 MCG (1000 UT) tablet, Take 2 tablets by mouth Daily., Disp: , Rfl:     Needle, Disp, (BD Disp Needle) 23G X 1\" misc, Use as directed to inject testosterone once a week, Disp: 4 each, Rfl: 3    Needle, Disp, (BD Disp Needles) 20G X 1-1/2\" misc, Use as directed to draw up testosterone once a week, Disp: 4 each, Rfl: 3    Needle, Disp, 18G X 1-1/2\" misc, Use for drawing up the medication, Disp: 50 each, Rfl: 3    Allergies:   Allergies   Allergen Reactions    Cephalosporins Anaphylaxis    Doxycycline Anaphylaxis    Reglan [Metoclopramide] Irritability    Mobic [Meloxicam] Rash    Sulfa Antibiotics Rash       Objective     Physical Exam:   Vital Signs: There were no vitals filed for " this visit.  There is no height or weight on file to calculate BMI.     Physical Exam  Vitals and nursing note reviewed.   Constitutional:       General: He is not in acute distress.     Appearance: Normal appearance. He is not ill-appearing.   HENT:      Head: Normocephalic.   Pulmonary:      Effort: Pulmonary effort is normal.   Neurological:      Mental Status: He is alert and oriented to person, place, and time.   Psychiatric:         Mood and Affect: Mood normal.         Behavior: Behavior normal.     Labs:   Brief Urine Lab Results       None          Lab Results   Component Value Date    HGB 17.3 08/23/2024    HCT 49.7 08/23/2024     Images:   No Images in the past 120 days found.    Assessment / Plan      Assessment/Plan: Иван Rosales is a 44 y.o. male who presents today for follow up with hypogonadism on TRT.  Total testosterone is 444.3 and free testosterone was 10.7. Hemoglobin is 17.6 and hematocrit was 51.8.  He last had therapeutic phlebotomy done on December 5th. He is overall feeling well. No new or worsening urologic complaints.      Plan:    1) Polycythemia: Will fax orders to [facility] for Mr. Rosales to donate quarterly.    2) Hypogonadism   -sent refill for Testosterone Cypionate today.   -return for follow up in 6 months with labs to be done 1-2 weeks prior to visit midweek prior to 10 am.     -Patient to call his PCP today or tomorrow to make sure that they have received my orders for lab work.     -Patient is responsible for getting lab work done as ordered and making sure that I have a copy to review.      Diagnoses and all orders for this visit:    1. Polycythemia (Primary)    2. Hypogonadism male  -     Testosterone Cypionate (DEPOTESTOTERONE CYPIONATE) 200 MG/ML injection; Inject 1 mL into the appropriate muscle as directed by prescriber Every 7 (Seven) Days.  Dispense: 4 mL; Refill: 0  -     Testosterone, Free, Total; Future  -     Hemoglobin & Hematocrit, Blood; Future    Follow Up:   Return in  about 6 months (around 6/30/2025) for Next scheduled follow up, with Amelia with labs done prior.    JEFERSON King, NP-C  [facility] [place] [facility] [place]

## 2025-03-31 DIAGNOSIS — E29.1 HYPOGONADISM MALE: ICD-10-CM

## 2025-04-04 ENCOUNTER — DOCUMENTATION (OUTPATIENT)
Dept: UROLOGY | Facility: CLINIC | Age: 45
End: 2025-04-04
Payer: COMMERCIAL

## 2025-04-04 RX ORDER — NEEDLES, DISPOSABLE 25GX5/8"
NEEDLE, DISPOSABLE MISCELLANEOUS
Qty: 4 EACH | Refills: 3 | OUTPATIENT
Start: 2025-04-04

## 2025-04-04 RX ORDER — BLOOD PRESSURE TEST KIT
KIT MISCELLANEOUS
Qty: 100 EACH | Refills: 11 | OUTPATIENT
Start: 2025-04-04

## 2025-04-04 RX ORDER — TESTOSTERONE CYPIONATE 200 MG/ML
200 INJECTION, SOLUTION INTRAMUSCULAR
Qty: 4 ML | Refills: 0 | OUTPATIENT
Start: 2025-04-04

## 2025-04-04 NOTE — TELEPHONE ENCOUNTER
Patient is NON-compliant with TRT testosterone replacement policy    Last OV visit      12/30/2024     Last LABS       12/05/2024 (external)     Next scheduled OV visit (not scheduled)    Refill due (LAST REFILL 12/30/2024    Confirmed pharmacy  [facility]    Last HCT 51.8 12/05/2024

## 2025-04-04 NOTE — TELEPHONE ENCOUNTER
Patient does not have an appointment with me to be seen.  He has not requested a refill since January.  Can we find out what is going on here.      I am refusing this refill due to not having an appointment and improper requesting of Testosterone refills

## 2025-04-08 DIAGNOSIS — E29.1 HYPOGONADISM MALE: ICD-10-CM

## 2025-04-08 RX ORDER — TESTOSTERONE CYPIONATE 200 MG/ML
200 INJECTION, SOLUTION INTRAMUSCULAR
Qty: 4 ML | Refills: 0 | Status: SHIPPED | OUTPATIENT
Start: 2025-04-08

## 2025-04-25 ENCOUNTER — OFFICE VISIT (OUTPATIENT)
Dept: UROLOGY | Facility: CLINIC | Age: 45
End: 2025-04-25
Payer: COMMERCIAL

## 2025-04-25 VITALS
HEIGHT: 76 IN | TEMPERATURE: 98 F | DIASTOLIC BLOOD PRESSURE: 86 MMHG | HEART RATE: 82 BPM | SYSTOLIC BLOOD PRESSURE: 120 MMHG | WEIGHT: 245 LBS | BODY MASS INDEX: 29.83 KG/M2 | OXYGEN SATURATION: 98 %

## 2025-04-25 DIAGNOSIS — D75.1 POLYCYTHEMIA: ICD-10-CM

## 2025-04-25 DIAGNOSIS — E29.1 HYPOGONADISM MALE: Primary | ICD-10-CM

## 2025-04-25 RX ORDER — ONDANSETRON 4 MG/1
TABLET, ORALLY DISINTEGRATING ORAL
COMMUNITY
Start: 2025-01-21

## 2025-04-25 RX ORDER — PANTOPRAZOLE SODIUM 40 MG/1
TABLET, DELAYED RELEASE ORAL
COMMUNITY
Start: 2024-11-13

## 2025-04-25 RX ORDER — BUDESONIDE 2 MG/10ML
SUSPENSION ORAL
COMMUNITY

## 2025-04-25 RX ORDER — LISINOPRIL 20 MG/1
TABLET ORAL
COMMUNITY
Start: 2025-04-24

## 2025-04-25 RX ORDER — BUDESONIDE 1 MG/2ML
INHALANT ORAL
COMMUNITY

## 2025-04-25 RX ORDER — TESTOSTERONE CYPIONATE 200 MG/ML
150 INJECTION, SOLUTION INTRAMUSCULAR
Qty: 4 ML | Refills: 0 | Status: SHIPPED | OUTPATIENT
Start: 2025-04-25

## 2025-04-25 NOTE — PROGRESS NOTES
Office Visit     Patient Name: Иван Rosales  : 1980   MRN: [id number]   Patient or patient representative verbalized consent for the use of Ambient Listening during the visit with  JEFERSON Vale for chart documentation. 2025  11:08 EDT    Chief Complaint   Patient presents with    Hypogonadism male     Pt is here for testosterone follow up, Pt states he is doing well     Referring Provider: No ref. provider found    Primary Care Provider: Amalia Parekh APRN     History of Present Illness  Mr. Rosales is a 44 year old male who presents for evaluation of hypogonadism and polycythemia.    Hypogonadism  - Feels well on current testosterone regimen and is here to discuss labs.    - PSA normal in December will plan on repeating annually.     Polycythemia  - Undergoes therapeutic phlebotomy approximately every other month  - Most recent session involved the extraction of 450 mL of blood on 2025    Subjective   Review of System: As noted in HPI.    Past Medical History:   Diagnosis Date    ADHD     Hypogonadism in male     Mood change     Polycythemia      Past Surgical History:   Procedure Laterality Date    KNEE SURGERY       Family History   Problem Relation Age of Onset    Cancer Mother         Throat cancer    Hypertension Mother     Cancer Father         Throat cancer     Social History     Socioeconomic History    Marital status:    Tobacco Use    Smoking status: Never     Passive exposure: Never    Smokeless tobacco: Never   Vaping Use    Vaping status: Never Used   Substance and Sexual Activity    Alcohol use: Never    Drug use: Never    Sexual activity: Yes     Partners: Female     Birth control/protection: None       Current Outpatient Medications:     Alcohol Swabs pads, Clean area prior to injection, Disp: 100 each, Rfl: 11    amphetamine-dextroamphetamine (ADDERALL) 15 MG tablet, 1 tablet., Disp: , Rfl:     Budesonide (Eohilia) 2 MG/10ML suspension, Take 10 mL twice a  "day by oral route for 30 days., Disp: , Rfl:     budesonide (PULMICORT) 1 MG/2ML nebulizer solution, Inhale by nebulization route for 29 days., Disp: , Rfl:     cholecalciferol (VITAMIN D3) 25 MCG (1000 UT) tablet, Take 2 tablets by mouth Daily., Disp: , Rfl:     lisinopril (PRINIVIL,ZESTRIL) 20 MG tablet, , Disp: , Rfl:     Needle, Disp, (BD Disp Needle) 23G X 1\" misc, Use as directed to inject testosterone once a week, Disp: 4 each, Rfl: 3    Needle, Disp, (BD Disp Needles) 20G X 1-1/2\" misc, Use as directed to draw up testosterone once a week, Disp: 4 each, Rfl: 3    Needle, Disp, 18G X 1-1/2\" misc, Use for drawing up the medication, Disp: 50 each, Rfl: 3    ondansetron ODT (ZOFRAN-ODT) 4 MG disintegrating tablet, , Disp: , Rfl:     pantoprazole (PROTONIX) 40 MG EC tablet, Take 1 tablet twice a day by oral route for 30 days., Disp: , Rfl:     Testosterone Cypionate (Depo-Testosterone) 200 MG/ML injection, Inject 0.75 mL into the appropriate muscle as directed by prescriber Every 7 (Seven) Days., Disp: 4 mL, Rfl: 0    Allergies   Allergen Reactions    Cephalosporins Anaphylaxis    Doxycycline Anaphylaxis    Reglan [Metoclopramide] Irritability    Albuterol Unknown - Low Severity    Mobic [Meloxicam] Rash    Sulfa Antibiotics Rash     Objective   Visit Vitals  /86   Pulse 82   Temp 98 °F (36.7 °C) (Infrared)   Ht 193 cm (75.98\")   Wt 111 kg (245 lb)   SpO2 98%   BMI 29.83 kg/m²      Body mass index is 29.83 kg/m².   Physical Exam  Vitals and nursing note reviewed.   Constitutional:       General: He is not in acute distress.     Appearance: Normal appearance. He is not ill-appearing.   HENT:      Head: Normocephalic and atraumatic.   Pulmonary:      Effort: Pulmonary effort is normal.   Skin:     General: Skin is warm and dry.   Neurological:      General: No focal deficit present.      Mental Status: He is alert and oriented to person, place, and time.   Psychiatric:         Mood and Affect: Mood normal.    " "     Behavior: Behavior normal.      Labs  No results found for: \"COLORU\", \"CLARITYU\", \"SPECGRAV\", \"PHUR\", \"LEUKOCYTESUR\", \"NITRITE\", \"PROTEINPOCUA\", \"GLUCOSEUR\", \"KETONESU\", \"UROBILINOGEN\", \"BILIRUBINUR\", \"RBCUR\"   No results found for: \"WBCUA\", \"RBCUA\", \"BACTERIA\", \"HYALCASTU\", \"SQUAMEPIUA\"     Lab Results   Component Value Date    HGB 17.3 08/23/2024    HCT 49.7 08/23/2024     Lab Results   Component Value Date    TESTOSTEROTT 1360 (H) 08/23/2024    TESTFRE 31.2 (H) 08/23/2024    ESTRADIOL 69.0 08/23/2024     Radiographic Studies  No Images in the past 120 days found.    I have reviewed the above labs and imaging.   Assessment / Plan    Diagnoses and all orders for this visit:    1. Hypogonadism male (Primary)  -     Testosterone, Free, Total; Future  -     Hemoglobin & Hematocrit, Blood; Future  -     Testosterone Cypionate (Depo-Testosterone) 200 MG/ML injection; Inject 0.75 mL into the appropriate muscle as directed by prescriber Every 7 (Seven) Days.  Dispense: 4 mL; Refill: 0    2. Polycythemia  -     Testosterone, Free, Total; Future  -     Hemoglobin & Hematocrit, Blood; Future       Assessment & Plan  1.  Hypogonadism   - Total testosterone levels supratherapeutic at 1085 with free testostereone of 26.3.  - Propose dose reduction to 150 mg/week to alleviate polycythemia and reduce frequency of blood donations  - Advised to undergo lab tests 1.5 weeks prior to next visit and upload results into [facility]  - Tests should be conducted in the morning, midweek of dosing schedule, preferably before 10:00 AM  - If satisfactory, should be able to reduce blood donation frequency  - New prescription for adjusted testosterone dose sent to pharmacy    2. Polycythemia   - Most recent blood donation/TP was performed on April 6th.    - Hgb/Hct 18.1 and 51.7     Follow-up  - Follow up in 3 months via telemedicine     Return in about 3 months (around 7/25/2025) for f/u with Amelia , video visit.    Amelia Diaz, MSN, APRN, " SHELLY  [facility] [place]

## 2025-06-12 DIAGNOSIS — E29.1 HYPOGONADISM MALE: ICD-10-CM

## 2025-06-12 RX ORDER — TESTOSTERONE CYPIONATE 200 MG/ML
150 INJECTION, SOLUTION INTRAMUSCULAR
Qty: 4 ML | Refills: 0 | OUTPATIENT
Start: 2025-06-12

## 2025-06-12 RX ORDER — BLOOD PRESSURE TEST KIT
KIT MISCELLANEOUS
Qty: 100 EACH | Refills: 11 | OUTPATIENT
Start: 2025-06-12

## 2025-06-12 RX ORDER — NEEDLES, DISPOSABLE 25GX5/8"
NEEDLE, DISPOSABLE MISCELLANEOUS
Qty: 4 EACH | Refills: 3 | OUTPATIENT
Start: 2025-06-12

## 2025-06-19 ENCOUNTER — TELEPHONE (OUTPATIENT)
Dept: UROLOGY | Facility: CLINIC | Age: 45
End: 2025-06-19
Payer: COMMERCIAL

## 2025-06-19 DIAGNOSIS — E29.1 HYPOGONADISM MALE: ICD-10-CM

## 2025-06-19 NOTE — TELEPHONE ENCOUNTER
"  Caller: JUNIOR    Relationship: SPOUSE    Best call back number: [phone]     Requested Prescriptions:  Needle, Disp, (BD Disp Needle) 23G X 1\" misc [Amelia Emily]         Needle, Disp, (BD Disp Needles) 20G X 1-1/2\" misc [Amelia Emily]         Needle, Disp, 18G X 1-1/2\" misc [Amelia [place]]         Alcohol Swabs pads [Amelia Emily]         Testosterone Cypionate (Depo-Testosterone) 200 MG/ML injection [Amelia [place]]    Requested Prescriptions      No prescriptions requested or ordered in this encounter        Pharmacy where request should be sent:  [facility] [place] [id number] [facility] [phone]  - [phone] [address] [address]  Phone: [phone] Fax: [phone]        Last office visit with prescribing clinician: 4/25/2025   Last telemedicine visit with prescribing clinician: 12/30/2024   Next office visit with prescribing clinician: Visit date not found     Additional details provided by patient: PT WIFE SAYS PT WAS THERE LAST MONTH NOT UNTI;    Does the patient have less than a 3 day supply:  [x] Yes  [] No    Would you like a call back once the refill request has been completed: [x] Yes [] No    If the office needs to give you a call back, can they leave a voicemail: [x] Yes [] No    Mark Diaz   06/19/25 13:36 EDT         "

## 2025-06-20 RX ORDER — TESTOSTERONE CYPIONATE 200 MG/ML
150 INJECTION, SOLUTION INTRAMUSCULAR
Qty: 4 ML | Refills: 0 | Status: SHIPPED | OUTPATIENT
Start: 2025-06-20

## 2025-07-17 DIAGNOSIS — E29.1 HYPOGONADISM MALE: ICD-10-CM

## 2025-07-18 RX ORDER — NEEDLES, DISPOSABLE 25GX5/8"
NEEDLE, DISPOSABLE MISCELLANEOUS
Qty: 4 EACH | Refills: 11 | Status: SHIPPED | OUTPATIENT
Start: 2025-07-18

## 2025-07-18 RX ORDER — TESTOSTERONE CYPIONATE 200 MG/ML
150 INJECTION, SOLUTION INTRAMUSCULAR
Qty: 4 ML | Refills: 0 | Status: SHIPPED | OUTPATIENT
Start: 2025-07-18

## 2025-07-18 RX ORDER — BLOOD PRESSURE TEST KIT
KIT MISCELLANEOUS
Qty: 100 EACH | Refills: 0 | Status: SHIPPED | OUTPATIENT
Start: 2025-07-18

## 2025-07-22 ENCOUNTER — TELEPHONE (OUTPATIENT)
Dept: UROLOGY | Facility: CLINIC | Age: 45
End: 2025-07-22

## 2025-07-22 DIAGNOSIS — E29.1 HYPOGONADISM MALE: ICD-10-CM

## 2025-07-22 DIAGNOSIS — D75.1 POLYCYTHEMIA: Primary | ICD-10-CM

## 2025-07-22 RX ORDER — SODIUM CHLORIDE 9 MG/ML
250 INJECTION, SOLUTION INTRAVENOUS ONCE
Status: CANCELLED | OUTPATIENT
Start: 2025-07-22

## 2025-07-22 NOTE — TELEPHONE ENCOUNTER
I called patient to let him know JEFERSON King was in meeting and will get on SIRS-Labt video as soon as she's done. Patient stated he did labs at [facility] but results have not been faxed over and nothing is in epic. Patient got them drawn on 7/16 but doesn't have copy of results. Patient decided to reschedule since results are not in epic. Patient wanted JEFERSON King know that [facility] will not proceed with PHLEB if HCT is >50 but <52. Patient is okay with rescheduling on next available Friday appt in-person.

## 2025-07-23 DIAGNOSIS — D75.1 POLYCYTHEMIA: Primary | ICD-10-CM

## 2025-08-08 ENCOUNTER — OFFICE VISIT (OUTPATIENT)
Dept: UROLOGY | Facility: CLINIC | Age: 45
End: 2025-08-08
Payer: COMMERCIAL

## 2025-08-08 VITALS
TEMPERATURE: 97.8 F | SYSTOLIC BLOOD PRESSURE: 124 MMHG | DIASTOLIC BLOOD PRESSURE: 82 MMHG | OXYGEN SATURATION: 98 % | HEART RATE: 92 BPM | HEIGHT: 76 IN | BODY MASS INDEX: 29.61 KG/M2 | WEIGHT: 243.2 LBS

## 2025-08-08 DIAGNOSIS — E29.1 HYPOGONADISM MALE: Primary | ICD-10-CM

## 2025-08-08 DIAGNOSIS — D75.1 POLYCYTHEMIA: ICD-10-CM

## 2025-08-23 DIAGNOSIS — E29.1 HYPOGONADISM MALE: ICD-10-CM

## 2025-08-25 RX ORDER — BLOOD PRESSURE TEST KIT
KIT MISCELLANEOUS
Qty: 100 EACH | Refills: 0 | OUTPATIENT
Start: 2025-08-25

## 2025-08-25 RX ORDER — TESTOSTERONE CYPIONATE 200 MG/ML
150 INJECTION, SOLUTION INTRAMUSCULAR
Qty: 4 ML | Refills: 0 | Status: SHIPPED | OUTPATIENT
Start: 2025-08-25

## 2025-08-25 RX ORDER — NEEDLES, DISPOSABLE 25GX5/8"
NEEDLE, DISPOSABLE MISCELLANEOUS
Qty: 4 EACH | Refills: 11 | OUTPATIENT
Start: 2025-08-25